# Patient Record
Sex: MALE | Race: WHITE | NOT HISPANIC OR LATINO | Employment: STUDENT | URBAN - METROPOLITAN AREA
[De-identification: names, ages, dates, MRNs, and addresses within clinical notes are randomized per-mention and may not be internally consistent; named-entity substitution may affect disease eponyms.]

---

## 2022-12-13 ENCOUNTER — DOCUMENTATION ONLY (OUTPATIENT)
Dept: OTHER | Facility: CLINIC | Age: 22
End: 2022-12-13

## 2022-12-15 NOTE — PROGRESS NOTES
PTA medications updated by Medication Scribe prior to surgery via phone call with patient (last doses completed by Nurse)     Medication history sources: Patient  In the past week, patient estimated taking medication this percent of the time: Greater than 90%  Adherence assessment: N/A Not Observed    Significant changes made to the medication list:  None      Additional medication history information:   None    Medication reconciliation completed by provider prior to medication history? No    Time spent in this activity: 10 minutes    The information provided in this note is only as accurate as the sources available at the time of update(s)      Prior to Admission medications    Not on File         Medication history completed by:    Cornell Norman CPhT  Medication Scribe  [facility]

## 2022-12-16 ENCOUNTER — HOSPITAL ENCOUNTER (INPATIENT)
Facility: CLINIC | Age: 22
LOS: 1 days | Discharge: HOME | DRG: 142 | End: 2022-12-17
Attending: DENTIST | Admitting: DENTIST
Payer: COMMERCIAL

## 2022-12-16 ENCOUNTER — ANESTHESIA EVENT (OUTPATIENT)
Dept: SURGERY | Facility: CLINIC | Age: 22
DRG: 142 | End: 2022-12-16
Payer: COMMERCIAL

## 2022-12-16 ENCOUNTER — ANESTHESIA (OUTPATIENT)
Dept: SURGERY | Facility: CLINIC | Age: 22
DRG: 142 | End: 2022-12-16
Payer: COMMERCIAL

## 2022-12-16 PROBLEM — M26.04 MANDIBULAR HYPOPLASIA: Status: ACTIVE | Noted: 2022-12-16

## 2022-12-16 LAB — HGB BLD-MCNC: 17.2 G/DL (ref 13.3–17.7)

## 2022-12-16 PROCEDURE — 272N000001 HC OR GENERAL SUPPLY STERILE: Performed by: DENTIST

## 2022-12-16 PROCEDURE — 999N000141 HC STATISTIC PRE-PROCEDURE NURSING ASSESSMENT: Performed by: DENTIST

## 2022-12-16 PROCEDURE — G0378 HOSPITAL OBSERVATION PER HR: HCPCS

## 2022-12-16 PROCEDURE — 250N000011 HC RX IP 250 OP 636: Performed by: NURSE ANESTHETIST, CERTIFIED REGISTERED

## 2022-12-16 PROCEDURE — 96374 THER/PROPH/DIAG INJ IV PUSH: CPT | Mod: 59

## 2022-12-16 PROCEDURE — 250N000025 HC SEVOFLURANE, PER MIN: Performed by: DENTIST

## 2022-12-16 PROCEDURE — 370N000017 HC ANESTHESIA TECHNICAL FEE, PER MIN: Performed by: DENTIST

## 2022-12-16 PROCEDURE — 258N000003 HC RX IP 258 OP 636: Performed by: ANESTHESIOLOGY

## 2022-12-16 PROCEDURE — 120N000001 HC R&B MED SURG/OB

## 2022-12-16 PROCEDURE — 96375 TX/PRO/DX INJ NEW DRUG ADDON: CPT

## 2022-12-16 PROCEDURE — 250N000013 HC RX MED GY IP 250 OP 250 PS 637: Performed by: DENTIST

## 2022-12-16 PROCEDURE — 85018 HEMOGLOBIN: CPT | Performed by: ANESTHESIOLOGY

## 2022-12-16 PROCEDURE — 0NSV04Z REPOSITION LEFT MANDIBLE WITH INTERNAL FIXATION DEVICE, OPEN APPROACH: ICD-10-PCS | Performed by: DENTIST

## 2022-12-16 PROCEDURE — 36415 COLL VENOUS BLD VENIPUNCTURE: CPT | Performed by: ANESTHESIOLOGY

## 2022-12-16 PROCEDURE — 96376 TX/PRO/DX INJ SAME DRUG ADON: CPT | Mod: 59

## 2022-12-16 PROCEDURE — 250N000011 HC RX IP 250 OP 636: Performed by: DENTIST

## 2022-12-16 PROCEDURE — 250N000009 HC RX 250: Performed by: DENTIST

## 2022-12-16 PROCEDURE — 0NST04Z REPOSITION RIGHT MANDIBLE WITH INTERNAL FIXATION DEVICE, OPEN APPROACH: ICD-10-PCS | Performed by: DENTIST

## 2022-12-16 PROCEDURE — C1713 ANCHOR/SCREW BN/BN,TIS/BN: HCPCS | Performed by: DENTIST

## 2022-12-16 PROCEDURE — 250N000011 HC RX IP 250 OP 636: Performed by: ANESTHESIOLOGY

## 2022-12-16 PROCEDURE — 258N000003 HC RX IP 258 OP 636: Performed by: NURSE ANESTHETIST, CERTIFIED REGISTERED

## 2022-12-16 PROCEDURE — 258N000003 HC RX IP 258 OP 636: Performed by: DENTIST

## 2022-12-16 PROCEDURE — 710N000009 HC RECOVERY PHASE 1, LEVEL 1, PER MIN: Performed by: DENTIST

## 2022-12-16 PROCEDURE — 250N000009 HC RX 250: Performed by: NURSE ANESTHETIST, CERTIFIED REGISTERED

## 2022-12-16 PROCEDURE — 360N000077 HC SURGERY LEVEL 4, PER MIN: Performed by: DENTIST

## 2022-12-16 DEVICE — IMP PLATE STRK STR 04H BAR LOCKING 06MM 5510564: Type: IMPLANTABLE DEVICE | Site: MOUTH | Status: FUNCTIONAL

## 2022-12-16 DEVICE — IMP SCR STRK 2.0X10MM 5020410: Type: IMPLANTABLE DEVICE | Site: MOUTH | Status: FUNCTIONAL

## 2022-12-16 DEVICE — IMP SCR STRK 2.0X04MM 5020404: Type: IMPLANTABLE DEVICE | Site: MOUTH | Status: FUNCTIONAL

## 2022-12-16 DEVICE — IMPLANTABLE DEVICE: Type: IMPLANTABLE DEVICE | Site: MOUTH | Status: FUNCTIONAL

## 2022-12-16 RX ORDER — METHYLPREDNISOLONE SODIUM SUCCINATE 125 MG/2ML
125 INJECTION, POWDER, LYOPHILIZED, FOR SOLUTION INTRAMUSCULAR; INTRAVENOUS EVERY 6 HOURS
Status: COMPLETED | OUTPATIENT
Start: 2022-12-17 | End: 2022-12-17

## 2022-12-16 RX ORDER — LIDOCAINE 40 MG/G
CREAM TOPICAL
Status: DISCONTINUED | OUTPATIENT
Start: 2022-12-16 | End: 2022-12-17 | Stop reason: HOSPADM

## 2022-12-16 RX ORDER — POLYETHYLENE GLYCOL 3350 17 G/17G
17 POWDER, FOR SOLUTION ORAL DAILY
Status: DISCONTINUED | OUTPATIENT
Start: 2022-12-17 | End: 2022-12-17 | Stop reason: HOSPADM

## 2022-12-16 RX ORDER — ONDANSETRON 2 MG/ML
INJECTION INTRAMUSCULAR; INTRAVENOUS PRN
Status: DISCONTINUED | OUTPATIENT
Start: 2022-12-16 | End: 2022-12-16

## 2022-12-16 RX ORDER — KETOROLAC TROMETHAMINE 15 MG/ML
15 INJECTION, SOLUTION INTRAMUSCULAR; INTRAVENOUS EVERY 6 HOURS
Status: COMPLETED | OUTPATIENT
Start: 2022-12-16 | End: 2022-12-17

## 2022-12-16 RX ORDER — HYDROMORPHONE HCL IN WATER/PF 6 MG/30 ML
0.2 PATIENT CONTROLLED ANALGESIA SYRINGE INTRAVENOUS
Status: DISCONTINUED | OUTPATIENT
Start: 2022-12-16 | End: 2022-12-17 | Stop reason: HOSPADM

## 2022-12-16 RX ORDER — BENZOCAINE/MENTHOL 6 MG-10 MG
LOZENGE MUCOUS MEMBRANE PRN
Status: DISCONTINUED | OUTPATIENT
Start: 2022-12-16 | End: 2022-12-16 | Stop reason: HOSPADM

## 2022-12-16 RX ORDER — DIPHENHYDRAMINE HCL 25 MG
25 CAPSULE ORAL EVERY 6 HOURS PRN
Status: DISCONTINUED | OUTPATIENT
Start: 2022-12-16 | End: 2022-12-17 | Stop reason: HOSPADM

## 2022-12-16 RX ORDER — ONDANSETRON 4 MG/1
4 TABLET, ORALLY DISINTEGRATING ORAL EVERY 6 HOURS PRN
Status: DISCONTINUED | OUTPATIENT
Start: 2022-12-16 | End: 2022-12-17 | Stop reason: HOSPADM

## 2022-12-16 RX ORDER — HYDROMORPHONE HCL IN WATER/PF 6 MG/30 ML
0.2 PATIENT CONTROLLED ANALGESIA SYRINGE INTRAVENOUS EVERY 5 MIN PRN
Status: DISCONTINUED | OUTPATIENT
Start: 2022-12-16 | End: 2022-12-16 | Stop reason: HOSPADM

## 2022-12-16 RX ORDER — OXYMETAZOLINE HYDROCHLORIDE 0.05 G/100ML
SPRAY NASAL PRN
Status: DISCONTINUED | OUTPATIENT
Start: 2022-12-16 | End: 2022-12-16

## 2022-12-16 RX ORDER — MAGNESIUM HYDROXIDE 1200 MG/15ML
LIQUID ORAL PRN
Status: DISCONTINUED | OUTPATIENT
Start: 2022-12-16 | End: 2022-12-16 | Stop reason: HOSPADM

## 2022-12-16 RX ORDER — ACETAMINOPHEN 325 MG/1
650 TABLET ORAL EVERY 4 HOURS PRN
Status: DISCONTINUED | OUTPATIENT
Start: 2022-12-19 | End: 2022-12-17 | Stop reason: HOSPADM

## 2022-12-16 RX ORDER — METHYLPREDNISOLONE SODIUM SUCCINATE 125 MG/2ML
125 INJECTION, POWDER, LYOPHILIZED, FOR SOLUTION INTRAMUSCULAR; INTRAVENOUS ONCE
Status: COMPLETED | OUTPATIENT
Start: 2022-12-16 | End: 2022-12-16

## 2022-12-16 RX ORDER — NALOXONE HYDROCHLORIDE 0.4 MG/ML
0.2 INJECTION, SOLUTION INTRAMUSCULAR; INTRAVENOUS; SUBCUTANEOUS
Status: DISCONTINUED | OUTPATIENT
Start: 2022-12-16 | End: 2022-12-17 | Stop reason: HOSPADM

## 2022-12-16 RX ORDER — FENTANYL CITRATE 50 UG/ML
25 INJECTION, SOLUTION INTRAMUSCULAR; INTRAVENOUS EVERY 5 MIN PRN
Status: DISCONTINUED | OUTPATIENT
Start: 2022-12-16 | End: 2022-12-16 | Stop reason: HOSPADM

## 2022-12-16 RX ORDER — FENTANYL CITRATE 50 UG/ML
50 INJECTION, SOLUTION INTRAMUSCULAR; INTRAVENOUS EVERY 5 MIN PRN
Status: DISCONTINUED | OUTPATIENT
Start: 2022-12-16 | End: 2022-12-16 | Stop reason: HOSPADM

## 2022-12-16 RX ORDER — CEFAZOLIN SODIUM/WATER 2 G/20 ML
2 SYRINGE (ML) INTRAVENOUS SEE ADMIN INSTRUCTIONS
Status: DISCONTINUED | OUTPATIENT
Start: 2022-12-16 | End: 2022-12-16 | Stop reason: HOSPADM

## 2022-12-16 RX ORDER — AMOXICILLIN 250 MG
1 CAPSULE ORAL 2 TIMES DAILY
Status: DISCONTINUED | OUTPATIENT
Start: 2022-12-16 | End: 2022-12-17 | Stop reason: HOSPADM

## 2022-12-16 RX ORDER — FENTANYL CITRATE 50 UG/ML
INJECTION, SOLUTION INTRAMUSCULAR; INTRAVENOUS PRN
Status: DISCONTINUED | OUTPATIENT
Start: 2022-12-16 | End: 2022-12-16

## 2022-12-16 RX ORDER — DIPHENHYDRAMINE HYDROCHLORIDE 50 MG/ML
25 INJECTION INTRAMUSCULAR; INTRAVENOUS EVERY 6 HOURS PRN
Status: DISCONTINUED | OUTPATIENT
Start: 2022-12-16 | End: 2022-12-17 | Stop reason: HOSPADM

## 2022-12-16 RX ORDER — PSEUDOEPHEDRINE HCL 60 MG
60 TABLET ORAL EVERY 6 HOURS PRN
Status: DISCONTINUED | OUTPATIENT
Start: 2022-12-16 | End: 2022-12-17 | Stop reason: HOSPADM

## 2022-12-16 RX ORDER — NALOXONE HYDROCHLORIDE 0.4 MG/ML
0.4 INJECTION, SOLUTION INTRAMUSCULAR; INTRAVENOUS; SUBCUTANEOUS
Status: DISCONTINUED | OUTPATIENT
Start: 2022-12-16 | End: 2022-12-17 | Stop reason: HOSPADM

## 2022-12-16 RX ORDER — BENZOCAINE/MENTHOL 6 MG-10 MG
LOZENGE MUCOUS MEMBRANE 3 TIMES DAILY
Status: DISCONTINUED | OUTPATIENT
Start: 2022-12-16 | End: 2022-12-17 | Stop reason: HOSPADM

## 2022-12-16 RX ORDER — BISACODYL 10 MG
10 SUPPOSITORY, RECTAL RECTAL DAILY PRN
Status: DISCONTINUED | OUTPATIENT
Start: 2022-12-16 | End: 2022-12-17 | Stop reason: HOSPADM

## 2022-12-16 RX ORDER — ONDANSETRON 2 MG/ML
4 INJECTION INTRAMUSCULAR; INTRAVENOUS EVERY 6 HOURS PRN
Status: DISCONTINUED | OUTPATIENT
Start: 2022-12-16 | End: 2022-12-17 | Stop reason: HOSPADM

## 2022-12-16 RX ORDER — AMPICILLIN AND SULBACTAM 1; .5 G/1; G/1
1.5 INJECTION, POWDER, FOR SOLUTION INTRAMUSCULAR; INTRAVENOUS EVERY 8 HOURS
Status: COMPLETED | OUTPATIENT
Start: 2022-12-16 | End: 2022-12-17

## 2022-12-16 RX ORDER — OXYCODONE HYDROCHLORIDE 5 MG/1
5 TABLET ORAL EVERY 4 HOURS PRN
Status: DISCONTINUED | OUTPATIENT
Start: 2022-12-16 | End: 2022-12-17 | Stop reason: HOSPADM

## 2022-12-16 RX ORDER — SODIUM CHLORIDE, SODIUM LACTATE, POTASSIUM CHLORIDE, CALCIUM CHLORIDE 600; 310; 30; 20 MG/100ML; MG/100ML; MG/100ML; MG/100ML
INJECTION, SOLUTION INTRAVENOUS CONTINUOUS PRN
Status: DISCONTINUED | OUTPATIENT
Start: 2022-12-16 | End: 2022-12-16

## 2022-12-16 RX ORDER — ONDANSETRON 4 MG/1
4 TABLET, ORALLY DISINTEGRATING ORAL EVERY 30 MIN PRN
Status: DISCONTINUED | OUTPATIENT
Start: 2022-12-16 | End: 2022-12-16 | Stop reason: HOSPADM

## 2022-12-16 RX ORDER — CHLORHEXIDINE GLUCONATE ORAL RINSE 1.2 MG/ML
SOLUTION DENTAL PRN
Status: DISCONTINUED | OUTPATIENT
Start: 2022-12-16 | End: 2022-12-16 | Stop reason: HOSPADM

## 2022-12-16 RX ORDER — ONDANSETRON 2 MG/ML
4 INJECTION INTRAMUSCULAR; INTRAVENOUS EVERY 30 MIN PRN
Status: DISCONTINUED | OUTPATIENT
Start: 2022-12-16 | End: 2022-12-16 | Stop reason: HOSPADM

## 2022-12-16 RX ORDER — SODIUM CHLORIDE, SODIUM LACTATE, POTASSIUM CHLORIDE, CALCIUM CHLORIDE 600; 310; 30; 20 MG/100ML; MG/100ML; MG/100ML; MG/100ML
INJECTION, SOLUTION INTRAVENOUS CONTINUOUS
Status: DISCONTINUED | OUTPATIENT
Start: 2022-12-16 | End: 2022-12-16 | Stop reason: HOSPADM

## 2022-12-16 RX ORDER — METHYLPREDNISOLONE SODIUM SUCCINATE 125 MG/2ML
125 INJECTION, POWDER, LYOPHILIZED, FOR SOLUTION INTRAMUSCULAR; INTRAVENOUS EVERY 4 HOURS
Status: COMPLETED | OUTPATIENT
Start: 2022-12-16 | End: 2022-12-17

## 2022-12-16 RX ORDER — HYDROMORPHONE HCL IN WATER/PF 6 MG/30 ML
0.4 PATIENT CONTROLLED ANALGESIA SYRINGE INTRAVENOUS
Status: DISCONTINUED | OUTPATIENT
Start: 2022-12-16 | End: 2022-12-17 | Stop reason: HOSPADM

## 2022-12-16 RX ORDER — MEPERIDINE HYDROCHLORIDE 25 MG/ML
12.5 INJECTION INTRAMUSCULAR; INTRAVENOUS; SUBCUTANEOUS EVERY 5 MIN PRN
Status: DISCONTINUED | OUTPATIENT
Start: 2022-12-16 | End: 2022-12-16 | Stop reason: HOSPADM

## 2022-12-16 RX ORDER — CHLORHEXIDINE GLUCONATE ORAL RINSE 1.2 MG/ML
10 SOLUTION DENTAL ONCE
Status: COMPLETED | OUTPATIENT
Start: 2022-12-16 | End: 2022-12-16

## 2022-12-16 RX ORDER — NEOSTIGMINE METHYLSULFATE 1 MG/ML
VIAL (ML) INJECTION PRN
Status: DISCONTINUED | OUTPATIENT
Start: 2022-12-16 | End: 2022-12-16

## 2022-12-16 RX ORDER — ACETAMINOPHEN 325 MG/1
975 TABLET ORAL EVERY 8 HOURS
Status: DISCONTINUED | OUTPATIENT
Start: 2022-12-16 | End: 2022-12-17 | Stop reason: HOSPADM

## 2022-12-16 RX ORDER — LIDOCAINE HYDROCHLORIDE 20 MG/ML
INJECTION, SOLUTION INFILTRATION; PERINEURAL PRN
Status: DISCONTINUED | OUTPATIENT
Start: 2022-12-16 | End: 2022-12-16

## 2022-12-16 RX ORDER — LIDOCAINE HCL/EPINEPHRINE/PF 2%-1:200K
VIAL (ML) INJECTION PRN
Status: DISCONTINUED | OUTPATIENT
Start: 2022-12-16 | End: 2022-12-16 | Stop reason: HOSPADM

## 2022-12-16 RX ORDER — OXYCODONE HYDROCHLORIDE 5 MG/1
10 TABLET ORAL EVERY 4 HOURS PRN
Status: DISCONTINUED | OUTPATIENT
Start: 2022-12-16 | End: 2022-12-17 | Stop reason: HOSPADM

## 2022-12-16 RX ORDER — HYDROMORPHONE HCL IN WATER/PF 6 MG/30 ML
0.4 PATIENT CONTROLLED ANALGESIA SYRINGE INTRAVENOUS EVERY 5 MIN PRN
Status: DISCONTINUED | OUTPATIENT
Start: 2022-12-16 | End: 2022-12-16 | Stop reason: HOSPADM

## 2022-12-16 RX ORDER — SODIUM CHLORIDE, SODIUM LACTATE, POTASSIUM CHLORIDE, CALCIUM CHLORIDE 600; 310; 30; 20 MG/100ML; MG/100ML; MG/100ML; MG/100ML
INJECTION, SOLUTION INTRAVENOUS CONTINUOUS
Status: DISCONTINUED | OUTPATIENT
Start: 2022-12-16 | End: 2022-12-17 | Stop reason: HOSPADM

## 2022-12-16 RX ORDER — CHLORHEXIDINE GLUCONATE ORAL RINSE 1.2 MG/ML
15 SOLUTION DENTAL 2 TIMES DAILY
Status: DISCONTINUED | OUTPATIENT
Start: 2022-12-16 | End: 2022-12-17 | Stop reason: HOSPADM

## 2022-12-16 RX ORDER — PROPOFOL 10 MG/ML
INJECTION, EMULSION INTRAVENOUS PRN
Status: DISCONTINUED | OUTPATIENT
Start: 2022-12-16 | End: 2022-12-16

## 2022-12-16 RX ORDER — CEFAZOLIN SODIUM/WATER 2 G/20 ML
2 SYRINGE (ML) INTRAVENOUS
Status: DISCONTINUED | OUTPATIENT
Start: 2022-12-16 | End: 2022-12-16 | Stop reason: HOSPADM

## 2022-12-16 RX ORDER — PROCHLORPERAZINE MALEATE 10 MG
10 TABLET ORAL EVERY 6 HOURS PRN
Status: DISCONTINUED | OUTPATIENT
Start: 2022-12-16 | End: 2022-12-17 | Stop reason: HOSPADM

## 2022-12-16 RX ORDER — GLYCOPYRROLATE 0.2 MG/ML
INJECTION, SOLUTION INTRAMUSCULAR; INTRAVENOUS PRN
Status: DISCONTINUED | OUTPATIENT
Start: 2022-12-16 | End: 2022-12-16

## 2022-12-16 RX ADMIN — MIDAZOLAM 2 MG: 1 INJECTION INTRAMUSCULAR; INTRAVENOUS at 13:04

## 2022-12-16 RX ADMIN — NEOSTIGMINE METHYLSULFATE 2 MG: 1 INJECTION, SOLUTION INTRAVENOUS at 15:58

## 2022-12-16 RX ADMIN — ROCURONIUM BROMIDE 50 MG: 50 INJECTION, SOLUTION INTRAVENOUS at 13:22

## 2022-12-16 RX ADMIN — HYDROMORPHONE HYDROCHLORIDE 0.5 MG: 1 INJECTION, SOLUTION INTRAMUSCULAR; INTRAVENOUS; SUBCUTANEOUS at 14:15

## 2022-12-16 RX ADMIN — DEXMEDETOMIDINE HYDROCHLORIDE 20 MCG: 100 INJECTION, SOLUTION INTRAVENOUS at 13:33

## 2022-12-16 RX ADMIN — PHENYLEPHRINE HYDROCHLORIDE 0.3 MCG/KG/MIN: 10 INJECTION INTRAVENOUS at 13:40

## 2022-12-16 RX ADMIN — SODIUM CHLORIDE, POTASSIUM CHLORIDE, SODIUM LACTATE AND CALCIUM CHLORIDE: 600; 310; 30; 20 INJECTION, SOLUTION INTRAVENOUS at 12:19

## 2022-12-16 RX ADMIN — ONDANSETRON 4 MG: 2 INJECTION INTRAMUSCULAR; INTRAVENOUS at 15:56

## 2022-12-16 RX ADMIN — SODIUM CHLORIDE, POTASSIUM CHLORIDE, SODIUM LACTATE AND CALCIUM CHLORIDE: 600; 310; 30; 20 INJECTION, SOLUTION INTRAVENOUS at 18:40

## 2022-12-16 RX ADMIN — PROPOFOL 200 MG: 10 INJECTION, EMULSION INTRAVENOUS at 13:21

## 2022-12-16 RX ADMIN — HYDROCORTISONE: 1 CREAM TOPICAL at 20:29

## 2022-12-16 RX ADMIN — METHYLPREDNISOLONE SODIUM SUCCINATE 125 MG: 125 INJECTION, POWDER, FOR SOLUTION INTRAMUSCULAR; INTRAVENOUS at 22:22

## 2022-12-16 RX ADMIN — CHLORHEXIDINE GLUCONATE 0.12% ORAL RINSE 15 ML: 1.2 LIQUID ORAL at 21:09

## 2022-12-16 RX ADMIN — LIDOCAINE HYDROCHLORIDE 60 MG: 20 INJECTION, SOLUTION INFILTRATION; PERINEURAL at 13:21

## 2022-12-16 RX ADMIN — CHLORHEXIDINE GLUCONATE 0.12% ORAL RINSE 10 ML: 1.2 LIQUID ORAL at 11:42

## 2022-12-16 RX ADMIN — SODIUM CHLORIDE, POTASSIUM CHLORIDE, SODIUM LACTATE AND CALCIUM CHLORIDE: 600; 310; 30; 20 INJECTION, SOLUTION INTRAVENOUS at 14:57

## 2022-12-16 RX ADMIN — GLYCOPYRROLATE 0.2 MG: 0.2 INJECTION, SOLUTION INTRAMUSCULAR; INTRAVENOUS at 14:54

## 2022-12-16 RX ADMIN — METHYLPREDNISOLONE SODIUM SUCCINATE 125 MG: 125 INJECTION, POWDER, FOR SOLUTION INTRAMUSCULAR; INTRAVENOUS at 18:39

## 2022-12-16 RX ADMIN — FENTANYL CITRATE 100 MCG: 50 INJECTION, SOLUTION INTRAMUSCULAR; INTRAVENOUS at 13:21

## 2022-12-16 RX ADMIN — Medication 2 G: at 13:14

## 2022-12-16 RX ADMIN — ONDANSETRON 4 MG: 2 INJECTION INTRAMUSCULAR; INTRAVENOUS at 21:09

## 2022-12-16 RX ADMIN — AMPICILLIN SODIUM AND SULBACTAM SODIUM 1.5 G: 1; .5 INJECTION, POWDER, FOR SOLUTION INTRAMUSCULAR; INTRAVENOUS at 18:39

## 2022-12-16 RX ADMIN — METHYLPREDNISOLONE 125 MG: 125 INJECTION, POWDER, LYOPHILIZED, FOR SOLUTION INTRAMUSCULAR; INTRAVENOUS at 13:37

## 2022-12-16 RX ADMIN — GLYCOPYRROLATE 0.2 MG: 0.2 INJECTION, SOLUTION INTRAMUSCULAR; INTRAVENOUS at 15:58

## 2022-12-16 RX ADMIN — HYDROCORTISONE: 1 CREAM TOPICAL at 22:22

## 2022-12-16 RX ADMIN — ONDANSETRON 4 MG: 2 INJECTION INTRAMUSCULAR; INTRAVENOUS at 16:41

## 2022-12-16 RX ADMIN — KETOROLAC TROMETHAMINE 15 MG: 15 INJECTION, SOLUTION INTRAMUSCULAR; INTRAVENOUS at 18:38

## 2022-12-16 RX ADMIN — OXYMETAZOLINE HYDROCHLORIDE 2 SPRAY: 0.05 SPRAY NASAL at 13:08

## 2022-12-16 ASSESSMENT — LIFESTYLE VARIABLES: TOBACCO_USE: 0

## 2022-12-16 ASSESSMENT — ACTIVITIES OF DAILY LIVING (ADL)
ADLS_ACUITY_SCORE: 18

## 2022-12-16 ASSESSMENT — ENCOUNTER SYMPTOMS: SEIZURES: 0

## 2022-12-16 NOTE — PHARMACY-ADMISSION MEDICATION HISTORY
Pharmacy Medication History  Admission medication history interview status for the 12/16/2022  admission is complete. See EPIC admission navigator for prior to admission medications     Location of Interview: Phone  Medication history sources: Patient    Significant changes made to the medication list:  Does not take medications at home.      In the past week, patient estimated taking medication this percent of the time:     Additional medication history information:       Medication reconciliation completed by provider prior to medication history? N/A    Time spent in this activity: 5 min    Prior to Admission medications    Not on File       The information provided in this note is only as accurate as the sources available at the time of update(s)

## 2022-12-16 NOTE — BRIEF OP NOTE
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  Complications: None.  Implants:   Implant Name Type Inv. Item Serial No.  Lot No. LRB No. Used Action   IMP SCR STRK 2.3X6MM CROSS PIN [id number] - [id number] Metallic Hardware/[place] IMP SCR STRK 2.3X6MM CROSS PIN [id number]  Sensitive Object  N/A 1 Implanted   IMP PLATE STRK STR 04H BAR LOCKING 06MM [id number] - [id number] Metallic Hardware/[employer] IMP PLATE STRK STR 04H BAR LOCKING 06MM [id number]  HUMBLEDine in 4202 N/A 2 Implanted   IMP SCR STRK 2.0X10MM [id number] - [id number] Metallic Hardware/[place] IMP SCR STRK 2.0X10MM [id number]  Sensitive Object 4202 N/A 2 Implanted   IMP SCR STRK 2.0X04MM [id number] - [id number] Metallic Hardware/[place] IMP SCR STRK 2.0X04MM [id number]  HUMBELDine in 4202 N/A 7 Implanted   IMP SCR STRK 2.0X04MM [id number] - [id number] Metallic Hardware/[place] IMP SCR STRK 2.0X04MM [id number]  Sensitive Object 4202 N/A 1 Wasted   VECTOR TAS MINI SCREW     [id number] N/A 2 Implanted and Explanted

## 2022-12-16 NOTE — ANESTHESIA CARE TRANSFER NOTE
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    Anesthesia Type:   General     Note:    Oropharynx: oropharynx clear of all foreign objects and spontaneously breathing  Level of Consciousness: drowsy  Oxygen Supplementation: face mask  Level of Supplemental Oxygen (L/min / FiO2): 8  Independent Airway: airway patency satisfactory and stable  Dentition: dentition changed S/P dental procedure  Vital Signs Stable: post-procedure vital signs reviewed and stable  Report to RN Given: handoff report given  Patient transferred to: PACU    Handoff Report: Identifed the Patient, Identified the Reponsible Provider, Reviewed the pertinent medical history, Discussed the surgical course, Reviewed Intra-OP anesthesia mangement and issues during anesthesia, Set expectations for post-procedure period and Allowed opportunity for questions and acknowledgement of understanding      Vitals:  Vitals Value Taken Time   /86    Temp     Pulse 69 12/16/22 1631   Resp 20 12/16/22 1631   SpO2 98 % 12/16/22 1631   Vitals shown include unvalidated device data.    Electronically Signed By: JAME Christopher CRNA  December 16, 2022  4:32 PM

## 2022-12-16 NOTE — ANESTHESIA PROCEDURE NOTES
Airway       Patient location during procedure: OR       Procedure Start/Stop Times: 12/16/2022 1:25 PM  Staff -        CRNA: Kelton Braga APRN CRNA       Performed By: CRNA  Consent for Airway        Urgency: elective  Indications and Patient Condition       Indications for airway management: prudencio-procedural       Induction type:intravenous       Mask difficulty assessment: 1 - vent by mask    Final Airway Details       Final airway type: endotracheal airway       Successful airway: ETT - single, Nasal and MARIAH  Endotracheal Airway Details        ETT size (mm): 7.0       Cuffed: yes       Successful intubation technique: video laryngoscopy       VL Blade Size: Plummer 4       Grade View of Cords: 1       Adjucts: magill forceps       Position: Left       Measured from: nares    Post intubation assessment        Placement verified by: capnometry, equal breath sounds and chest rise        Number of attempts at approach: 1       Number of other approaches attempted: 0       Secured with: other (comment) (per surgeon)       Ease of procedure: easy       Dentition: Intact and Unchanged    Medication(s) Administered   Medication Administration Time: 12/16/2022 1:25 PM    Additional Comments       8.0, 7.5 too big

## 2022-12-16 NOTE — ANESTHESIA PREPROCEDURE EVALUATION
Anesthesia Pre-Procedure Evaluation    Patient: Kurt Prasad   MRN: [id number] : 2000        Procedure : Procedure(s):  BILATERAL SAGITTAL SPLIT OSTEOTOMY          Past Medical History:   Diagnosis Date     Anxiety       Past Surgical History:   Procedure Laterality Date     ENT SURGERY        No Known Allergies   Social History     Tobacco Use     Smoking status: Unknown     Smokeless tobacco: Not on file   Substance Use Topics     Alcohol use: Yes     Comment: rarely      Wt Readings from Last 1 Encounters:   22 60.3 kg (133 lb)        Anesthesia Evaluation   Pt has had prior anesthetic.     No history of anesthetic complications       ROS/MED HX  ENT/Pulmonary:    (-) tobacco use, asthma and sleep apnea   Neurologic:    (-) no seizures and no CVA   Cardiovascular:       METS/Exercise Tolerance:     Hematologic:       Musculoskeletal:       GI/Hepatic:    (-) GERD   Renal/Genitourinary:       Endo:    (-) Type II DM and thyroid disease   Psychiatric/Substance Use:       Infectious Disease:       Malignancy:       Other:            Physical Exam    Airway        Mallampati: I   TM distance: > 3 FB   Neck ROM: full   Mouth opening: > 3 cm    Respiratory Devices and Support         Dental  no notable dental history         Cardiovascular   cardiovascular exam normal          Pulmonary   pulmonary exam normal                OUTSIDE LABS:  CBC:   Lab Results   Component Value Date    HGB 17.2 2022     BMP: No results found for: NA, POTASSIUM, CHLORIDE, CO2, BUN, CR, GLC  COAGS: No results found for: PTT, INR, FIBR  POC: No results found for: BGM, HCG, HCGS  HEPATIC: No results found for: ALBUMIN, PROTTOTAL, ALT, AST, GGT, ALKPHOS, BILITOTAL, BILIDIRECT, NARESH  OTHER: No results found for: PH, LACT, A1C, ALLY, PHOS, MAG, LIPASE, AMYLASE, TSH, T4, T3, CRP, SED    Anesthesia Plan    ASA Status:  1      Anesthesia Type: General.     - Airway: ETT         Techniques and Equipment:     - Airway:  Video-Laryngoscope         Consents    Anesthesia Plan(s) and associated risks, benefits, and realistic alternatives discussed. Questions answered and patient/representative(s) expressed understanding.    - Discussed:     - Discussed with:  Patient         Postoperative Care    Pain management: IV analgesics, Oral pain medications.   PONV prophylaxis: Ondansetron (or other 5HT-3), Dexamethasone or Solumedrol, Background Propofol Infusion     Comments:                Yolanda Solorzano

## 2022-12-16 NOTE — OP NOTE
OPERATION REPORT       DATE OF SERVICE: December 16, 2022    SURGEON:  Duke Urena DDS   FIRST ASSISTANT:  Alireza Hu DDS, MD   ASSISTANT: Ivy Dickson     PREOPERATIVE DIAGNOSES:   1)  Mandibular A-P hypoplasia     POSTOPERATIVE DIAGNOSES:   Same as preoperative     PROCEDURES PERFORMED:     1) Bilateral sagittal split ramus osteotomy advancement with rigid internal fixation.    ANESTHESIA:   General anesthesia was administered via nasal endotracheal tube.   Adjunctive local anesthesia with 2% lidocaine with 1:100,000 epinephrine was administered via local infiltration, as well as bilateral inferior alveolar nerve blocks.  See anesthesia record for totals.    INDICATIONS FOR THE PROCEDURE: The patient was referred to our office for a consultation for orthognathic surgery. Following clinical and radiographic examination, the patient was noted to have a skeletal malocclusion that would benefit from orthognathic surgery (see above diagnosis). Following discussion of the treatment options with the patient elected to proceed with the treatment plan (see procedure above). The proposed treatment was reviewed in detail, as were the risks of the procedure including: pain, swelling, bleeding, infection, damage to adjacent teeth or structures, temporary or permanent V3 or V2 paresthesia, anesthesia or dysesthesia, cranial nerve VII paresis, need for root canal therapy, need for maxillomandibular fixation, need for hardware removal, potential need for occlusal adjustments, and orthodontic or surgical relapse. The patient [and parents] verbalized thorough understanding of the risks of the procedure. Written consent was signed and placed in patient's chart. Preoperatively, the occlusion was established with dental models. The planned occlusion was reviewed with the orthodontist preoperatively.     DESCRIPTION OF PROCEDURE: The patient was met in the preoperative holding area and the risks were reviewed as noted  above. Written consent was signed. The patient met with the anesthesia team, who reviewed the history and physical, and then transferred the patient to operating room. The patient then transferred self from the [facility] to the operating room table and was placed in the supine position. The patient was appropriately tucked and padded. The patient underwent IV induction and placement of a nasal endotracheal tube. An NG tube was also placed. The endotracheal tube was secured by the Anesthesia Service. The patient was prepped in a customary fashion for oral maxillofacial surgical procedures. A timeout was then performed according to [facility] protocol. A throat pack was placed and local anesthesia was administered.     At the outset of the procedure, conservative enameloplasty was performed on select teeth as noted on the presurgical models.    Attention was then directed to the right mandible. Local anesthesia was administered and a bite block was placed. Sweetheart retractor was used to retract the tongue and a [place] tail retractor was used to isolate the ascending ramus along with a [place] retractor. The #15 blade and needle tip cautery were then used to create an incision along the ascending ramus and external oblique ridge. The incision was extended to the mesial of the first molar. A #4 molt periosteal elevator was then used to reflect a full thickness periosteal flap along the lateral aspect of the mandibular body from anterior to posterior in a systematic fashion. A J-stripper was used to dissect the inferior border. The medial flap was then developed with a #4 molt periosteal elevator in a subperiosteal fashion superior to the lingula. A modified channel retractor was then used to retract the soft tissues and the neurovascular bundle medially. The lingula was identified with a blunt nerve hook. An oval bur was then used to create an osteotomy along the anterior ascending ramus in order to more  clearly visualize the lingula. A Alexander bur was then used to make the medial cut superior to the lingula for the medial cut, and then a lateral cut was then made in the area of the first and second molar with a Alexander bur. This was made through the inferior border. A sagittal cut connecting the two osteotomies was made with the reciprocating saw. These osteotomies were carried out with irrigation at all times. Attention was then turned to completion of the sagittal split osteotomy with a series of osteotomes. The right inferior alveolar nerve was intact and gently teased out of the proximal segment following completion of the osteotomy with a Pina osteotome and Pina . Interferences were then removed along the medial aspect of the proximal segment with an oval bur under irrigation. The procedure on the left-hand side was then completed in an identical fashion. The left inferior alveolar nerve was intact and gently teased out of the proximal segment following completion of the osteotomy.     Circumandibular wires were placed using 24 gauge with a Mixter and blunt lingual dissection per routine.  These were secured and tightened after the proximal segments were seated passively in CR.  Maxillomandibular fixation was applied, with the splint in place with 26-gauge wires. The patient's occlusion was noted to be appropriate as in the preoperative plan. A 4-hole Call bone plate was appied to the anterior vertical osteotomy bilaterlly and secured with x4 5mm bone screws.  A lag screw technique was then utilized using a large round bur to make a countersink depression in the lateral aspect of the proximal segment bilaterally. Then a #8 round bur was used to create a central hole through only the proximal segment. With the proximal segment/condyle gently seated within the glenoid fossa, a trocar and drill were used and a bicortical bone screw was placed x1 on each side. The fixation was noted to be stable  "and inferior borders adequately flush.    The patient was removed from maxillomandibular fixation and the occlusion was evaluated and noted to be stable, with appropriate midline position, overbite and overjet as in the preoperative set-up. CM wires were cut and removed per routine.  The mandibular surgical sites were then copiously irrigated with normal saline. The right and left mandibular incisions were then closed with a combination of interrupted and running 4-0 Vicryl sutures. The oral cavity was then irrigated and suctioned. The left lower lip near the commissure had a small <1cm split lip from likely dryness and stretch combined.  No thermal injury or bur injury noted.  To ensure appropriate healing, elected to place x2 fast absorbing gut sutures at the vermillian border and x2 4-0 vicryl Rapide sutures internally on the mucosa. The throat pack was then removed to suction. The nasogastric tube that was placed by Anesthesia at the beginning of the case was then to be removed by the Anesthesia Service. The needle and sponge counts were noted to be correct by 2. The patient was then turned over to the Anesthesia Service for a smooth emergence from anesthesia and extubation in the operating room. The patient was noted to be stable following completion of the procedure. The patient was planned for admission postoperatively for monitoring and pain control.     ESTIMATED BLOOD LOSS: 150 mL.   FLUIDS: Please see Anesthesia Report.   DRAINS: None.   SPECIMENS: None.   COMPLICATIONS: None evident.   IMPLANTS: Mandible: 2.0mm Los bone screw x2.  4-hole [place]flower plate placed bilaterally with x4 bone screws each.   FINDINGS: The patient's occlusion fit passively within the splint following fixation of the mandible. Following the procedure, the patient was placed in the medium 3/16\" triangle anterior elastics to guide the occlusion into a stable and repeatable position.         "

## 2022-12-16 NOTE — ANESTHESIA POSTPROCEDURE EVALUATION
Patient: Kurt Prasad    Procedure: Procedure(s):  BILATERAL SAGITTAL SPLIT OSTEOTOMY       Anesthesia Type:  General    Note:     Postop Pain Control: Uneventful            Sign Out: Well controlled pain   PONV: No   Neuro/Psych: Uneventful            Sign Out: Acceptable/Baseline neuro status   Airway/Respiratory: Uneventful            Sign Out: Acceptable/Baseline resp. status   CV/Hemodynamics: Uneventful            Sign Out: Acceptable CV status; No obvious hypovolemia; No obvious fluid overload   Other NRE:    DID A NON-ROUTINE EVENT OCCUR? No           Last vitals:  Vitals Value Taken Time   /67 12/16/22 1710   Temp     Pulse 72 12/16/22 1716   Resp 31 12/16/22 1716   SpO2 98 % 12/16/22 1716   Vitals shown include unvalidated device data.    Electronically Signed By: Misty Zaidi MD, MD  December 16, 2022  5:28 PM

## 2022-12-17 VITALS
HEART RATE: 73 BPM | RESPIRATION RATE: 20 BRPM | SYSTOLIC BLOOD PRESSURE: 105 MMHG | DIASTOLIC BLOOD PRESSURE: 57 MMHG | OXYGEN SATURATION: 96 % | WEIGHT: 133 LBS | TEMPERATURE: 98.4 F | BODY MASS INDEX: 19.7 KG/M2 | HEIGHT: 69 IN

## 2022-12-17 PROBLEM — M26.04 MANDIBULAR HYPOPLASIA: Status: RESOLVED | Noted: 2022-12-16 | Resolved: 2022-12-17

## 2022-12-17 LAB — GLUCOSE BLDC GLUCOMTR-MCNC: 152 MG/DL (ref 70–99)

## 2022-12-17 PROCEDURE — G0378 HOSPITAL OBSERVATION PER HR: HCPCS

## 2022-12-17 PROCEDURE — 250N000011 HC RX IP 250 OP 636: Performed by: DENTIST

## 2022-12-17 PROCEDURE — 258N000003 HC RX IP 258 OP 636: Performed by: DENTIST

## 2022-12-17 PROCEDURE — 96376 TX/PRO/DX INJ SAME DRUG ADON: CPT

## 2022-12-17 PROCEDURE — 250N000013 HC RX MED GY IP 250 OP 250 PS 637: Performed by: DENTIST

## 2022-12-17 RX ADMIN — ACETAMINOPHEN 975 MG: 325 TABLET, FILM COATED ORAL at 02:11

## 2022-12-17 RX ADMIN — METHYLPREDNISOLONE SODIUM SUCCINATE 125 MG: 125 INJECTION, POWDER, FOR SOLUTION INTRAMUSCULAR; INTRAVENOUS at 06:20

## 2022-12-17 RX ADMIN — SENNOSIDES AND DOCUSATE SODIUM 1 TABLET: 50; 8.6 TABLET ORAL at 07:52

## 2022-12-17 RX ADMIN — KETOROLAC TROMETHAMINE 15 MG: 15 INJECTION, SOLUTION INTRAMUSCULAR; INTRAVENOUS at 06:20

## 2022-12-17 RX ADMIN — ACETAMINOPHEN 975 MG: 325 TABLET, FILM COATED ORAL at 10:54

## 2022-12-17 RX ADMIN — CHLORHEXIDINE GLUCONATE 0.12% ORAL RINSE 15 ML: 1.2 LIQUID ORAL at 07:52

## 2022-12-17 RX ADMIN — POLYETHYLENE GLYCOL 3350 17 G: 17 POWDER, FOR SOLUTION ORAL at 07:51

## 2022-12-17 RX ADMIN — KETOROLAC TROMETHAMINE 15 MG: 15 INJECTION, SOLUTION INTRAMUSCULAR; INTRAVENOUS at 00:15

## 2022-12-17 RX ADMIN — SODIUM CHLORIDE, POTASSIUM CHLORIDE, SODIUM LACTATE AND CALCIUM CHLORIDE: 600; 310; 30; 20 INJECTION, SOLUTION INTRAVENOUS at 02:37

## 2022-12-17 RX ADMIN — HYDROCORTISONE: 1 CREAM TOPICAL at 07:54

## 2022-12-17 RX ADMIN — AMPICILLIN SODIUM AND SULBACTAM SODIUM 1.5 G: 1; .5 INJECTION, POWDER, FOR SOLUTION INTRAMUSCULAR; INTRAVENOUS at 02:37

## 2022-12-17 RX ADMIN — METHYLPREDNISOLONE SODIUM SUCCINATE 125 MG: 125 INJECTION, POWDER, FOR SOLUTION INTRAMUSCULAR; INTRAVENOUS at 02:11

## 2022-12-17 ASSESSMENT — ACTIVITIES OF DAILY LIVING (ADL)
ADLS_ACUITY_SCORE: 18

## 2022-12-17 NOTE — PROGRESS NOTES
Pt alert and oriented, up independently, tolerates diet, pain controled with oral tylenol, will discharge to home. Pt given written and verbal discharge instructions. Pt knows that follow up care is needed and who to call with any questions or concerns, all medications were prescribed before hospital visit with MD. All questions answered and pt is ready for discharge.

## 2022-12-17 NOTE — PROGRESS NOTES
"ORAL AND MAXILLOFACIAL SURGERY PROGRESS NOTE    Kurt is POD #1 s/p bilateral sagittal split osteotomy for mandibular advancement.  Patient seen on AM rounds resting comfortably in bed, mom at bedside.  Episodes of vomiting overnight, now resolved.  He is tolerating po liquids and applesauce, on po analgesics, IV analgesics discontinued, and he has voided to the bathroom on his own.  He states he wants to go home.  No acute complaints this morning.    Vital signs:  Temp: 98.4  F (36.9  C) Temp src: Axillary BP: 105/57 Pulse: 73   Resp: 20 SpO2: 96 % O2 Device: None (Room air) Oxygen Delivery: 8 LPM Height: 175.3 cm (5' 9\") Weight: 60.3 kg (133 lb)    Maxillofacial Exam:  Bilateral mandibular swelling as expected, soft, cool, minimally tender  Occlusion stable and reproducible into planned class I, midlines coincident, elastics in place  Airways patent  Mucosa pink and well perfused  Incision sites closed  V3 hypoesthesia bilaterally as expected    Assessment/Plan: Excellent progress for POD #1, stable for discharge to home.  He will ambulate around the floor today.  PO instructions reviewed, discharge medications have been picked up and are at home waiting, and follow up appointment for 1 week will be set up Monday.  Discharge this afternoon.    Mor Soto DDS        "

## 2022-12-17 NOTE — CONSULTS
NUTRITION EDUCATION    REASON FOR ASSESSMENT:  Nutrition education on Jaw Surgery Diet    CURRENT DIET:  Clear Liquid Jaw Surgery Diet    Visited with pt and his sister this morning  Pt tolerating po - has had applesauce and jello  Asking for some Ensure ---> advanced diet to Full Liquids      NUTRITION DIAGNOSIS:  Food- and nutrition-related knowledge deficit R/t lack of prior exposure to information AEB recent jaw surgery.    INTERVENTIONS:    Nutrition Prescription:  Recommended adequate calories and protein to promote healing, several small meals per day, and use of high protein oral supplements.    Implementation:    Assessed learning needs, learning preferences, and willingness to learn    Nutrition Education  (Content):  a) Provided handout  What to Eat After Jaw Surgery   b) Described diet progression per guidelines listed in handout  c) Discussed importance of small meals    Medical Food Supplements - recommended use of a high protein nutritional supplement    Anticipate good compliance    Diet Education - refer to Education Flowsheet    Goals:    Patient verbalizes understanding of diet     All of the above goals met during the education session

## 2022-12-17 NOTE — DISCHARGE SUMMARY
Kurt was admitted for postoperative observation s/p mandibular advancement.  Upon discharge, he was tolerating po liquids, pain controlled, ambulatory, and stable for discharge to home.  He will take discharge medications as instructed and follow up at [facility] as scheduled.

## 2022-12-17 NOTE — DISCHARGE INSTRUCTIONS
Activities:  No vigorous exercise or swimming should be allowed because of the difficulty in breathing and the strain on your fixation wires (if wired).  To facilitated breathing, a decongestant stray (ex. Afrin Nasal Spray) should be bought at a pharmacy and carried with you for cases of nasal congestion.  This should be used SPARINGLY.  Bathing/Incision Care  It is important to practice good oral hygiene.  This is VERY important to stop the possibility of rapid decay of the teeth and infection.  Post-operative day 2:  Brush your teeth 6-8 times a day.  A small, child-sized, soft toothbrush can be used on the outside of teeth to keep the wire and teeth free of debris.  Don t use Water-Pik until you have checked with your doctor.  Make sure hooks and power tubes on braces are clean.  Drink clear liquids after meals and check inside your mouth with your tongue for any debris.  Do several warm salt-rinses daily   Use smaller amounts of toothpaste.  What to expect:  Swelling following this type of surgery is completely normal.  Generally swelling increases for about 48-72 hours after surgery then begins to decrease gradually.  One half of the swelling will be gone in 7-10 days; however, it is normal for a small amount of swelling to persist for 4-6 weeks.  Call your surgeon if you have these signs or symptoms:  Fever/chills greater than 101 oF.  Pain not relieved with pain medicine.  With any questions or concerns.    Take all medications and follow-up as instructed by your surgeon.

## 2022-12-17 NOTE — PROGRESS NOTES
VSS. A/O. SBA. Jaw incisions. Facetent/jawbra on. toradol schedule given. zofran given. Bladder scanned of 512 ml. Pt would like to try voiding. Do not want str8t cath now.   Pt voiding fine now.

## 2022-12-17 NOTE — PLAN OF CARE
Goal Outcome Evaluation:    A&Ox4. VSS. On 8L O2 via Face tent mask. Pain managed w/ schedule tylenol and cold pack. PIV infusing. Tolerating clear liquid diet. Up w/ SBA to the bathroom. Void adequately. No N/V. Will continue to monitor.

## (undated) DEVICE — DRAPE MAYO STAND 23X54 8337

## (undated) DEVICE — BUR ROUND 4MM CARBIDE LONG 5092-228

## (undated) DEVICE — VSP ORTHOGNATHIC BUNDLE VSPORTHOG

## (undated) DEVICE — SPONGE PACK VAGINAL 2X36"

## (undated) DEVICE — BLADE SAW RECIPROCATING LORENTZ 50-5354

## (undated) DEVICE — SUCTION CANISTER MEDIVAC LINER 3000ML W/LID 65651-530

## (undated) DEVICE — SU PLAIN FAST ABSORB 5-0 PC-1 18" 1915G

## (undated) DEVICE — ESU GROUND PAD UNIVERSAL W/O CORD

## (undated) DEVICE — SOL NACL 0.9% IRRIG 1000ML BOTTLE 2F7124

## (undated) DEVICE — SOL WATER IRRIG 1000ML BOTTLE 2F7114

## (undated) DEVICE — BUR OVAL LINVATEC 4X8MM 5091-236

## (undated) DEVICE — BUR ROUND 2MM CARBIDE LONG 5092-224

## (undated) DEVICE — GOWN LG DISP 9515

## (undated) DEVICE — DECANTER VIAL 2006S

## (undated) DEVICE — PACK HEAD NECK SEN15HNFSF

## (undated) DEVICE — DRILL TWIST STRK 1.6MM XLG 9216820

## (undated) DEVICE — Device

## (undated) DEVICE — SU PROLENE 5-0 PS-3 18" 8681G

## (undated) DEVICE — DRILL BIT TWIST 2.0X1.6X5MM 60-16005

## (undated) DEVICE — LINEN TOWEL PACK X5 5464

## (undated) RX ORDER — METHYLPREDNISOLONE SODIUM SUCCINATE 125 MG/2ML
INJECTION, POWDER, LYOPHILIZED, FOR SOLUTION INTRAMUSCULAR; INTRAVENOUS
Status: DISPENSED
Start: 2022-12-16

## (undated) RX ORDER — ONDANSETRON 2 MG/ML
INJECTION INTRAMUSCULAR; INTRAVENOUS
Status: DISPENSED
Start: 2022-12-16

## (undated) RX ORDER — GLYCOPYRROLATE 0.2 MG/ML
INJECTION, SOLUTION INTRAMUSCULAR; INTRAVENOUS
Status: DISPENSED
Start: 2022-12-16

## (undated) RX ORDER — HYDROMORPHONE HYDROCHLORIDE 1 MG/ML
INJECTION, SOLUTION INTRAMUSCULAR; INTRAVENOUS; SUBCUTANEOUS
Status: DISPENSED
Start: 2022-12-16

## (undated) RX ORDER — CHLORHEXIDINE GLUCONATE ORAL RINSE 1.2 MG/ML
SOLUTION DENTAL
Status: DISPENSED
Start: 2022-12-16

## (undated) RX ORDER — FENTANYL CITRATE 50 UG/ML
INJECTION, SOLUTION INTRAMUSCULAR; INTRAVENOUS
Status: DISPENSED
Start: 2022-12-16

## (undated) RX ORDER — NEOSTIGMINE METHYLSULFATE 1 MG/ML
VIAL (ML) INJECTION
Status: DISPENSED
Start: 2022-12-16

## (undated) RX ORDER — LIDOCAINE HCL/EPINEPHRINE/PF 2%-1:200K
VIAL (ML) INJECTION
Status: DISPENSED
Start: 2022-12-16

## (undated) RX ORDER — BENZOCAINE/MENTHOL 6 MG-10 MG
LOZENGE MUCOUS MEMBRANE
Status: DISPENSED
Start: 2022-12-16

## (undated) RX ORDER — OXYMETAZOLINE HYDROCHLORIDE 0.05 G/100ML
SPRAY NASAL
Status: DISPENSED
Start: 2022-12-16